# Patient Record
Sex: MALE | Race: WHITE | NOT HISPANIC OR LATINO | Employment: FULL TIME | URBAN - METROPOLITAN AREA
[De-identification: names, ages, dates, MRNs, and addresses within clinical notes are randomized per-mention and may not be internally consistent; named-entity substitution may affect disease eponyms.]

---

## 2023-07-17 ENCOUNTER — HOSPITAL ENCOUNTER (EMERGENCY)
Facility: HOSPITAL | Age: 33
Discharge: HOME | End: 2023-07-17
Attending: EMERGENCY MEDICINE
Payer: COMMERCIAL

## 2023-07-17 VITALS
HEART RATE: 58 BPM | TEMPERATURE: 98 F | BODY MASS INDEX: 24.25 KG/M2 | SYSTOLIC BLOOD PRESSURE: 128 MMHG | WEIGHT: 183 LBS | RESPIRATION RATE: 18 BRPM | HEIGHT: 73 IN | OXYGEN SATURATION: 99 % | DIASTOLIC BLOOD PRESSURE: 71 MMHG

## 2023-07-17 DIAGNOSIS — M94.0 COSTOCHONDRITIS: Primary | ICD-10-CM

## 2023-07-17 DIAGNOSIS — R07.9 CHEST PAIN: ICD-10-CM

## 2023-07-17 LAB
POC CARDIAC TROPONIN I: 0 NG/ML (ref 0–0.08)
SAMPLE: NORMAL

## 2023-07-17 PROCEDURE — 93005 ELECTROCARDIOGRAM TRACING: CPT

## 2023-07-17 PROCEDURE — 93010 ELECTROCARDIOGRAM REPORT: CPT | Mod: ,,, | Performed by: INTERNAL MEDICINE

## 2023-07-17 PROCEDURE — 84484 ASSAY OF TROPONIN QUANT: CPT

## 2023-07-17 PROCEDURE — 93010 EKG 12-LEAD: ICD-10-PCS | Mod: ,,, | Performed by: INTERNAL MEDICINE

## 2023-07-17 PROCEDURE — 99284 EMERGENCY DEPT VISIT MOD MDM: CPT

## 2023-07-17 RX ORDER — IBUPROFEN 600 MG/1
600 TABLET ORAL EVERY 6 HOURS PRN
Qty: 20 TABLET | Refills: 0 | Status: SHIPPED | OUTPATIENT
Start: 2023-07-17

## 2023-07-17 RX ORDER — CYCLOBENZAPRINE HCL 5 MG
5 TABLET ORAL 3 TIMES DAILY PRN
Qty: 30 TABLET | Refills: 0 | Status: SHIPPED | OUTPATIENT
Start: 2023-07-17 | End: 2023-07-27

## 2023-07-17 NOTE — ED PROVIDER NOTES
Encounter Date: 7/17/2023       History     Chief Complaint   Patient presents with    Pleurisy     Pt c/o right sided CP that started approx 1 hour PTA while pt was working out. Pt reports the pain increases with inspiration. Pt denies SOB.      This 33-year-old man presents with complaints of right-sided chest pain that started approximately an hour prior to arrival while he was working.  Pain is and 1 very specific location to the right of his sternum to ribs down from the clavicle.  It is worse with movement.  He is had no shortness of breath no nausea no vomiting no sweating.  He does work out at the gym.  And he does physical labor.  He denies recent cough or cold symptoms or fever.     Review of patient's allergies indicates:  No Known Allergies  No past medical history on file.  No past surgical history on file.  No family history on file.     Review of Systems   Constitutional:  Negative for fever.   HENT:  Negative for sore throat.    Respiratory:  Negative for shortness of breath.    Cardiovascular:  Positive for chest pain.   Gastrointestinal:  Negative for nausea.   Genitourinary:  Negative for dysuria.   Musculoskeletal:  Negative for back pain.   Skin:  Negative for rash.   Neurological:  Negative for weakness.   Hematological:  Does not bruise/bleed easily.     Physical Exam     Initial Vitals [07/17/23 0938]   BP Pulse Resp Temp SpO2   138/79 (!) 52 18 98 °F (36.7 °C) 98 %      MAP       --         Physical Exam    Nursing note and vitals reviewed.  Constitutional: He appears well-developed and well-nourished.   HENT:   Head: Normocephalic and atraumatic.   Mouth/Throat: Mucous membranes are normal.   Eyes: EOM are normal. Pupils are equal, round, and reactive to light.   Neck: Neck supple.   Normal range of motion.  Cardiovascular:  Normal rate, regular rhythm, normal heart sounds and intact distal pulses.           Pulmonary/Chest: Breath sounds normal.   Abdominal: Abdomen is soft. Bowel sounds  are normal.   Musculoskeletal:         General: Normal range of motion.      Cervical back: Normal range of motion and neck supple.     Neurological: He is alert and oriented to person, place, and time. He has normal strength.   Skin: Skin is warm and dry. Capillary refill takes less than 2 seconds.   Psychiatric: He has a normal mood and affect. His behavior is normal. Judgment and thought content normal.       ED Course   Procedures  Labs Reviewed   POCT TROPONIN     EKG Readings: (Independently Interpreted)   Rhythm: Sinus Bradycardia. Heart Rate: 52. Ectopy: No Ectopy. T Waves Flipped: V1 and V2. Other Impression: Septal infarct age indeterminate (I doubt this)   7/17/23 0941     Imaging Results    None          Medications - No data to display  Medical Decision Making:   ED Management:  The patient has the Q-waves in V1 and V2 however his symptoms are clear and physical exam consistent with costochondritis his risk factors for coronary disease or only male sex.  He has no family history his cholesterol is normal and he does not smoke.  I have suggested that he might make an appointment with Cardiology just to get a 2nd opinion. Troponin 0.00                        Clinical Impression:   Final diagnoses:  [R07.9] Chest pain  [M94.0] Costochondritis (Primary)        ED Disposition Condition    Discharge Stable          ED Prescriptions       Medication Sig Dispense Start Date End Date Auth. Provider    ibuprofen (ADVIL,MOTRIN) 600 MG tablet Take 1 tablet (600 mg total) by mouth every 6 (six) hours as needed for Pain. 20 tablet 7/17/2023 -- Ralph Randall MD    cyclobenzaprine (FLEXERIL) 5 MG tablet Take 1 tablet (5 mg total) by mouth 3 (three) times daily as needed for Muscle spasms. 30 tablet 7/17/2023 7/27/2023 Ralph Randall MD          Follow-up Information       Follow up With Specialties Details Why Contact Info    Follow up with your PCP of choice.  Schedule an appointment as soon as possible for a  visit                Ralph Randall MD  07/17/23 1038       Ralph Randall MD  07/17/23 1038